# Patient Record
Sex: FEMALE | Race: WHITE | HISPANIC OR LATINO
[De-identification: names, ages, dates, MRNs, and addresses within clinical notes are randomized per-mention and may not be internally consistent; named-entity substitution may affect disease eponyms.]

---

## 2018-08-07 ENCOUNTER — TRANSCRIPTION ENCOUNTER (OUTPATIENT)
Age: 33
End: 2018-08-07

## 2022-11-30 ENCOUNTER — OFFICE (OUTPATIENT)
Dept: URBAN - METROPOLITAN AREA CLINIC 93 | Facility: CLINIC | Age: 37
Setting detail: OPHTHALMOLOGY
End: 2022-11-30
Payer: COMMERCIAL

## 2022-11-30 DIAGNOSIS — H16.223: ICD-10-CM

## 2022-11-30 PROCEDURE — 92004 COMPRE OPH EXAM NEW PT 1/>: CPT | Performed by: OPHTHALMOLOGY

## 2022-11-30 ASSESSMENT — REFRACTION_AUTOREFRACTION
OD_AXIS: 91
OS_CYLINDER: SPHERE
OS_AXIS: 081
OD_CYLINDER: -0.25
OD_AXIS: 080
OD_SPHERE: -0.25
OD_CYLINDER: -0.50
OS_CYLINDER: -0.50
OS_SPHERE: PLANO
OD_SPHERE: +0.25
OS_SPHERE: +0.25

## 2022-11-30 ASSESSMENT — VISUAL ACUITY
OS_BCVA: 20/30
OD_BCVA: 20/40

## 2022-11-30 ASSESSMENT — CONFRONTATIONAL VISUAL FIELD TEST (CVF)
OS_FINDINGS: FULL
OD_FINDINGS: FULL

## 2022-11-30 ASSESSMENT — REFRACTION_CURRENTRX
OD_CYLINDER: SPHERE
OD_OVR_VA: 20/
OS_VPRISM_DIRECTION: SV
OS_OVR_VA: 20/
OS_CYLINDER: SPHERE
OS_SPHERE: -0.75
OD_SPHERE: -0.75
OD_VPRISM_DIRECTION: SV

## 2022-11-30 ASSESSMENT — SPHEQUIV_DERIVED
OD_SPHEQUIV: -0.375
OD_SPHEQUIV: 0

## 2024-04-26 ENCOUNTER — NON-APPOINTMENT (OUTPATIENT)
Age: 39
End: 2024-04-26

## 2024-04-26 PROBLEM — Z00.00 ENCOUNTER FOR PREVENTIVE HEALTH EXAMINATION: Status: ACTIVE | Noted: 2024-04-26

## 2024-04-26 RX ORDER — AZELASTINE HYDROCHLORIDE AND FLUTICASONE PROPIONATE 137; 50 UG/1; UG/1
137-50 SPRAY, METERED NASAL
Refills: 0 | Status: ACTIVE | COMMUNITY

## 2024-04-26 RX ORDER — CLARITHROMYCIN 250 MG/5ML
250 FOR SUSPENSION ORAL
Refills: 0 | Status: ACTIVE | COMMUNITY

## 2024-05-15 ENCOUNTER — LABORATORY RESULT (OUTPATIENT)
Age: 39
End: 2024-05-15

## 2024-05-16 ENCOUNTER — APPOINTMENT (OUTPATIENT)
Dept: PEDIATRIC ALLERGY IMMUNOLOGY | Facility: CLINIC | Age: 39
End: 2024-05-16
Payer: MEDICAID

## 2024-05-16 DIAGNOSIS — R06.2 WHEEZING: ICD-10-CM

## 2024-05-16 DIAGNOSIS — T78.3XXA ANGIONEUROTIC EDEMA, INITIAL ENCOUNTER: ICD-10-CM

## 2024-05-16 DIAGNOSIS — L50.1 IDIOPATHIC URTICARIA: ICD-10-CM

## 2024-05-16 PROCEDURE — 99204 OFFICE O/P NEW MOD 45 MIN: CPT | Mod: 25

## 2024-05-16 PROCEDURE — 94010 BREATHING CAPACITY TEST: CPT

## 2024-05-16 PROCEDURE — 99214 OFFICE O/P EST MOD 30 MIN: CPT | Mod: 25

## 2024-05-16 PROCEDURE — 95012 NITRIC OXIDE EXP GAS DETER: CPT

## 2024-05-16 PROCEDURE — 95004 PERQ TESTS W/ALRGNC XTRCS: CPT

## 2024-06-04 ENCOUNTER — APPOINTMENT (OUTPATIENT)
Dept: PEDIATRIC ALLERGY IMMUNOLOGY | Facility: CLINIC | Age: 39
End: 2024-06-04
Payer: MEDICAID

## 2024-06-04 DIAGNOSIS — J31.1 CHRONIC NASOPHARYNGITIS: ICD-10-CM

## 2024-06-04 DIAGNOSIS — J01.91 ACUTE RECURRENT SINUSITIS, UNSPECIFIED: ICD-10-CM

## 2024-06-04 DIAGNOSIS — J31.0 CHRONIC RHINITIS: ICD-10-CM

## 2024-06-04 PROCEDURE — 99213 OFFICE O/P EST LOW 20 MIN: CPT

## 2024-06-05 PROBLEM — J31.1 CHRONIC NASOPHARYNGITIS: Status: ACTIVE | Noted: 2024-04-26

## 2024-06-05 PROBLEM — J31.0 CHRONIC RHINITIS: Status: ACTIVE | Noted: 2024-04-26

## 2024-06-05 PROBLEM — R06.2 WHEEZING: Status: ACTIVE | Noted: 2024-06-05

## 2024-06-05 PROBLEM — J01.91 ACUTE RECURRENT SINUSITIS, UNSPECIFIED: Status: ACTIVE | Noted: 2024-04-26

## 2024-06-05 PROBLEM — T78.3XXA ANGIONEUROTIC EDEMA, INITIAL ENCOUNTER: Status: ACTIVE | Noted: 2024-04-26

## 2024-06-05 PROBLEM — L50.1 IDIOPATHIC URTICARIA: Status: ACTIVE | Noted: 2024-04-26

## 2024-06-05 NOTE — REASON FOR VISIT
[Evaluation/Consultation] : an evaluation/consultation of [Allergic Rhinitis] : allergic rhinitis [Congestion] : congestion [Runny Nose] : runny nose

## 2024-06-05 NOTE — HISTORY OF PRESENT ILLNESS
[de-identified] : Damián comes today and gives the following medical history.  She has noted that she has had increasing sneezing, congestion and coughing over the past 1 year.  She does note that she has had decrease in smell in 2021.  Presently she is on Xyzal which decreases her sneezing.  She notes that her left ear feels full and congested.  She has had wheezing symptoms. [None] : The patient is currently asymptomatic [id number]

## 2024-06-05 NOTE — HISTORY OF PRESENT ILLNESS
[de-identified] : Damián comes today to review her sinus CAT scan, and blood test for immune evaluation and allergy evaluation.  She continues to have symptoms of sneezing and nasal congestion for which she takes Zyrtec.  She states it does help her symptoms.  Her lab tests were did not show any immune deficiency and there is no evidence of eosinophilia.  Her allergy evaluation was negative and her blood test as well.

## 2024-06-05 NOTE — REASON FOR VISIT
[Routine Follow-Up] : a routine follow-up visit for [Congestion] : congestion [Runny Nose] : runny nose [FreeTextEntry3] : Recurrent sinusitis

## 2024-06-05 NOTE — PHYSICAL EXAM
[Alert] : alert [Well Nourished] : well nourished [Healthy Appearance] : healthy appearance [No Acute Distress] : no acute distress [Well Developed] : well developed [Normal Pupil & Iris Size/Symmetry] : normal pupil and iris size and symmetry [No Discharge] : no discharge [No Photophobia] : no photophobia [Normal TMs] : both tympanic membranes were normal [Sclera Not Icteric] : sclera not icteric [Normal Nasal Mucosa] : the nasal mucosa was normal [Normal Lips/Tongue] : the lips and tongue were normal [Normal Outer Ear/Nose] : the ears and nose were normal in appearance [Normal Tonsils] : normal tonsils [No Thrush] : no thrush [Pale mucosa] : pale mucosa [Supple] : the neck was supple [Normal Rate and Effort] : normal respiratory rhythm and effort [No Crackles] : no crackles [No Retractions] : no retractions [Bilateral Audible Breath Sounds] : bilateral audible breath sounds [Normal Rate] : heart rate was normal  [Normal S1, S2] : normal S1 and S2 [No murmur] : no murmur [Regular Rhythm] : with a regular rhythm [Soft] : abdomen soft [Not Tender] : non-tender [Not Distended] : not distended [No HSM] : no hepato-splenomegaly [Normal Cervical Lymph Nodes] : cervical [Skin Intact] : skin intact  [No Rash] : no rash [No Skin Lesions] : no skin lesions [No clubbing] : no clubbing [No Edema] : no edema [No Cyanosis] : no cyanosis [Normal Mood] : mood was normal [Normal Affect] : affect was normal [Alert, Awake, Oriented as Age-Appropriate] : alert, awake, oriented as age appropriate

## 2024-06-05 NOTE — IMPRESSION
[Pulmonary Function Test] : Pulmonary Function Test [Normal PFT] : pulmonary function test normal  [FreeTextEntry1] : FEV1 is normal at 83% of predicted. FENO is 9 PPB [Allergy Testing Dust Mite] : dust mites [Allergy Testing Mixed Feathers] : feathers [Allergy Testing Cockroach] : cockroach [Allergy Testing Cat] : cat [Allergy Testing Dog] : dog [] : molds [Allergy Testing Trees] : trees [Allergy Testing Grasses] : grasses [Allergy Testing Weeds] : weeds [FreeTextEntry2] : Environmental allergy skin tests are all negative.

## 2024-06-05 NOTE — SOCIAL HISTORY
[Radiator/Baseboard] : heating provided by radiator(s)/baseboard(s) [Central] : air conditioning provided by central unit [Humidifier] : does not use a humidifier [Dehumidifier] : does not use a dehumidifier [Dust Mite Covers] : does not have dust mite covers [Feather Pillows] : does not have feather pillows [Feather Comforter] : does not have a feather comforter [Bedroom] : not in the bedroom [Basement] : not in the basement

## 2024-06-05 NOTE — PHYSICAL EXAM
[Alert] : alert [Well Nourished] : well nourished [Healthy Appearance] : healthy appearance [No Acute Distress] : no acute distress [Well Developed] : well developed [Normal Pupil & Iris Size/Symmetry] : normal pupil and iris size and symmetry [No Discharge] : no discharge [No Photophobia] : no photophobia [Sclera Not Icteric] : sclera not icteric [Normal TMs] : both tympanic membranes were normal [Normal Nasal Mucosa] : the nasal mucosa was normal [Normal Lips/Tongue] : the lips and tongue were normal [Normal Tonsils] : normal tonsils [Normal Outer Ear/Nose] : the ears and nose were normal in appearance [No Thrush] : no thrush [Pale mucosa] : pale mucosa [Supple] : the neck was supple [Normal Rate and Effort] : normal respiratory rhythm and effort [No Crackles] : no crackles [No Retractions] : no retractions [Bilateral Audible Breath Sounds] : bilateral audible breath sounds [Normal Rate] : heart rate was normal  [Normal S1, S2] : normal S1 and S2 [No murmur] : no murmur [Regular Rhythm] : with a regular rhythm [Soft] : abdomen soft [Not Tender] : non-tender [Not Distended] : not distended [No HSM] : no hepato-splenomegaly [Normal Cervical Lymph Nodes] : cervical [Skin Intact] : skin intact  [No Rash] : no rash [No Skin Lesions] : no skin lesions [No Edema] : no edema [No clubbing] : no clubbing [No Cyanosis] : no cyanosis [Normal Mood] : mood was normal [Normal Affect] : affect was normal [Alert, Awake, Oriented as Age-Appropriate] : alert, awake, oriented as age appropriate

## 2024-06-05 NOTE — ASSESSMENT
[FreeTextEntry1] : No evidence of airway inflammation.  She has mild inflammation in several of her sinuses.  There is no allergic etiology to her symptoms.  There is no evidence of eosinophilic disease.  This was discussed with her.  I suggested she continue symptomatic treatment with antihistamines.  She has mild sinusitis and therefore I would not suggest that she seek sinus surgery.

## 2024-06-05 NOTE — ASSESSMENT
[FreeTextEntry1] : Damián has a history of rhinitis however her allergy skin tests are negative.  Her history is also significant for episodes of sinusitis.  That has been recurrent.  And that she has had recurrent sinusitis I obtain blood test for an immune evaluation, and an allergy evaluation.  I also recommended that she have a repeat sinus CAT scan.  She will be followed up in several weeks to discuss these results.